# Patient Record
(demographics unavailable — no encounter records)

---

## 2025-06-24 NOTE — PHYSICAL EXAM
[Alert] : alert [Well Nourished] : well nourished [Healthy Appearance] : healthy appearance [No Acute Distress] : no acute distress [Well Developed] : well developed [Normal Voice/Communication] : normal voice communication [Normal Pupil & Iris Size/Symmetry] : normal pupil and iris size and symmetry [No Discharge] : no discharge [Sclera Not Icteric] : sclera not icteric [Normal TMs] : both tympanic membranes were normal [Normal Nasal Mucosa] : the nasal mucosa was normal [Normal Lips/Tongue] : the lips and tongue were normal [Normal Outer Ear/Nose] : the ears and nose were normal in appearance [Normal Tonsils] : normal tonsils [Supple] : the neck was supple [Normal Rate and Effort] : normal respiratory rhythm and effort [No Crackles] : no crackles [No Retractions] : no retractions [Bilateral Audible Breath Sounds] : bilateral audible breath sounds [Normal Rate] : heart rate was normal  [Regular Rhythm] : with a regular rhythm [Skin Intact] : skin intact  [No Rash] : no rash [No Skin Lesions] : no skin lesions [Normal Mood] : mood was normal [Normal Affect] : affect was normal [Alert, Awake, Oriented as Age-Appropriate] : alert, awake, oriented as age appropriate [No clubbing] : no clubbing [No Edema] : no edema [Full ROM with no contractures] : full range of motion with no contractures [Judgment and Insight Age Appropriate] : judgement and insight is age appropriate

## 2025-06-25 NOTE — DATA REVIEWED
[FreeTextEntry1] : 9/2022 IgE Peanut- 34.80 (poly + 1,2,3,8,6) Hazelnut- 21.80 Cashew- 8.14 Pistachio- 8.82 [place]- 3.18 [place]- 0.56 Shrimp- 0.35 Milk- 4.99 Negative to brazil nut, pecan  9/2022 environmental blood work + DM, cat, dog, feather, tree, grass, weed

## 2025-06-25 NOTE — DATA REVIEWED
[FreeTextEntry1] : 9/2022 IgE Peanut- 34.80 (poly + 1,2,3,8,6) Hazelnut- 21.80 Cashew- 8.14 Pistachio- 8.82 [place]- 3.18 [place]- 0.56 Shrimp- 0.35 Milk- 4.99 Negative to brazil nut, pecan  9/2022 environmental blood work + DM, cat, dog, feather, tree, grass, weed

## 2025-06-25 NOTE — SOCIAL HISTORY
[House] : [unfilled] lives in a house  [Central Forced Air] : heating provided by central forced air [Central] : air conditioning provided by central unit [Dry] : dry [Dust Mite Covers] : has dust mite covers [Bedroom] :  in bedroom [Dog] : dog [Feather Pillows] : does not have feather pillows [Feather Comforter] : does not have a feather comforter [Basement] : not in the basement [Living Area] : not in the living area [Smokers in Household] : there are no smokers in the home

## 2025-06-25 NOTE — END OF VISIT
[FreeTextEntry3] :  I, Dr. Saunders, personally performed the evaluation and management (E/M) services for this established patient who presents today with (a) new problem(s)/exacerbation of (an) existing condition(s). That E/M includes conducting the clinically appropriate interval history &/or exam, assessing all new/exacerbated conditions, and establishing a new plan of care. Today, my CHARITY, Betty Buckner, was here to observe my evaluation and management service for this new problem/exacerbated condition and follow the plan of care established by me going forward.

## 2025-06-25 NOTE — IMPRESSION
[Spirometry] : Spirometry [_____] : peanut ([unfilled]) [________] : [unfilled] [Normal Spirometry] : spirometry normal [Fractional of Exhaled Nitric Oxide ___] : Fractional of Exhaled Nitric Oxide [unfilled] [High] : high
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
## 2025-06-25 NOTE — REASON FOR VISIT
[Routine Follow-Up] : a routine follow-up visit for [Patient] : patient [Mother] : mother [FreeTextEntry2] : food allergies, and asthma

## 2025-06-25 NOTE — REVIEW OF SYSTEMS
[Nl] : Endocrine [SOB with Exertion] : no dyspnea on exertion [Nocturnal Awakening] : no nocturnal awakening with shortness of breath
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
